# Patient Record
Sex: FEMALE | Race: WHITE | HISPANIC OR LATINO | URBAN - METROPOLITAN AREA
[De-identification: names, ages, dates, MRNs, and addresses within clinical notes are randomized per-mention and may not be internally consistent; named-entity substitution may affect disease eponyms.]

---

## 2017-10-10 ENCOUNTER — APPOINTMENT (RX ONLY)
Dept: URBAN - METROPOLITAN AREA CLINIC 39 | Facility: CLINIC | Age: 37
Setting detail: DERMATOLOGY
End: 2017-10-10

## 2017-10-10 DIAGNOSIS — L91.8 OTHER HYPERTROPHIC DISORDERS OF THE SKIN: ICD-10-CM

## 2017-10-10 PROCEDURE — ? SKIN TAG REMOVAL (COSMETIC)

## 2017-10-10 PROCEDURE — ? COUNSELING

## 2017-10-10 PROCEDURE — ? TREATMENT REGIMEN

## 2017-10-10 ASSESSMENT — LOCATION DETAILED DESCRIPTION DERM
LOCATION DETAILED: RIGHT INFERIOR LATERAL NECK
LOCATION DETAILED: LEFT INFERIOR LATERAL NECK
LOCATION DETAILED: MONS PUBIS

## 2017-10-10 ASSESSMENT — LOCATION ZONE DERM
LOCATION ZONE: NECK
LOCATION ZONE: VULVA

## 2017-10-10 ASSESSMENT — LOCATION SIMPLE DESCRIPTION DERM
LOCATION SIMPLE: GROIN
LOCATION SIMPLE: LEFT ANTERIOR NECK
LOCATION SIMPLE: RIGHT ANTERIOR NECK

## 2017-10-10 NOTE — PROCEDURE: TREATMENT REGIMEN
Plan: Local anesthetic was for L lateral pubic lesion only, There was no clinical suspicion of HPV for latter based on location and appearance
Detail Level: Detailed

## 2017-10-10 NOTE — PROCEDURE: SKIN TAG REMOVAL (COSMETIC)
Consent: Verbal consent obtained and the risks of skin tag removal was reviewed with the patient including but not limited to recurrence, bleeding, pigmentary change, infection, pain, and remote possibility of scarring.
Anesthesia Volume In Cc: 1
Price (Use Numbers Only, No Special Characters Or $): 150.00
Removed With: scissors
Hemostasis: Drysol
Detail Level: Detailed
Anesthesia Type: 1% lidocaine without epinephrine